# Patient Record
Sex: FEMALE | Race: ASIAN
[De-identification: names, ages, dates, MRNs, and addresses within clinical notes are randomized per-mention and may not be internally consistent; named-entity substitution may affect disease eponyms.]

---

## 2018-06-26 ENCOUNTER — HOSPITAL ENCOUNTER (EMERGENCY)
Dept: HOSPITAL 76 - ED | Age: 38
Discharge: HOME | End: 2018-06-26
Payer: COMMERCIAL

## 2018-06-26 VITALS — SYSTOLIC BLOOD PRESSURE: 157 MMHG | DIASTOLIC BLOOD PRESSURE: 92 MMHG

## 2018-06-26 DIAGNOSIS — B02.9: Primary | ICD-10-CM

## 2018-06-26 PROCEDURE — 99283 EMERGENCY DEPT VISIT LOW MDM: CPT

## 2018-06-26 NOTE — ED PHYSICIAN DOCUMENTATION
PD HPI SKIN





- Stated complaint


Stated Complaint: RASH RT KNEE





- Chief complaint


Chief Complaint: Wound





- History obtained from


History obtained from: Patient





- History of Present Illness


Timing - onset: Enter  time (22:30), Today


Timing - details: Abrupt onset


Pain level now: 1


Location: RLE


Quality / character: Burning, Discolored, Raised


Recently seen: Not recently seen





- Additional information


Additional information: 





diagnosed with shingles RLE approximately 5 months ago, was not prescribed 

medication because it was several days after rash developed. The rash 

eventually resolved, but tonight she noticed recurrence of lesions in the same 

site (but clearly new lesions, as the previous had resolved). 





Review of Systems


Constitutional: denies: Fever


Skin: reports: Rash





PD PAST MEDICAL HISTORY





- Past Medical History


Past Medical History: No


Derm: Herpes zoster





- Past Surgical History


Past Surgical History: No





- Present Medications


Home Medications: 


 Ambulatory Orders











 Medication  Instructions  Recorded  Confirmed


 


Valacyclovir HCl [Valacyclovir] 1,000 mg PO TID #20 tablet 06/26/18 














- Allergies


Allergies/Adverse Reactions: 


 Allergies











Allergy/AdvReac Type Severity Reaction Status Date / Time


 


No Known Drug Allergies Allergy   Verified 06/26/18 00:28














- Social History


Does the pt smoke?: No


Smoking Status: Never smoker


Does the pt drink ETOH?: No


Does the pt have substance abuse?: No





- Immunizations


Immunizations are current?: Yes





- POLST


Patient has POLST: No





PD ED PE NORMAL





- Vitals


Vital signs reviewed: Yes





- General


General: Alert and oriented X 3, No acute distress, Well developed/nourished





- Extremities


Extremities: No edema





PD ED PE EXPANDED





- Extremities


Extremities: Other (exanthem is on posterior surface of right knee; there are 

two clusters of erythematous vesicles)





Results





- Vitals


Vitals: 


 Vital Signs - 24 hr











  06/26/18





  00:23


 


Temperature 36.4 C L


 


Heart Rate 75


 


Respiratory 18





Rate 


 


Blood Pressure 157/92 H


 


O2 Saturation 97








 Oxygen











O2 Source                      Room air

















PD MEDICAL DECISION MAKING





- ED course


Complexity details: considered differential, d/w patient


ED course: 





rash is very s/o shingles (zoster), just started tonight and thus will rx 

valacyclovir





- Sepsis Event


Vital Signs: 


 Vital Signs - 24 hr











  06/26/18





  00:23


 


Temperature 36.4 C L


 


Heart Rate 75


 


Respiratory 18





Rate 


 


Blood Pressure 157/92 H


 


O2 Saturation 97








 Oxygen











O2 Source                      Room air

















Departure





- Departure


Disposition: 01 Home, Self Care


Clinical Impression: 


 Shingles





Condition: Good


Instructions:  ED Shingles


Prescriptions: 


Valacyclovir HCl [Valacyclovir] 1,000 mg PO TID #20 tablet


Discharge Date/Time: 06/26/18 01:20

## 2021-06-09 ENCOUNTER — HOSPITAL ENCOUNTER (OUTPATIENT)
Dept: HOSPITAL 76 - DI | Age: 41
Discharge: HOME | End: 2021-06-09
Attending: GENERAL ACUTE CARE HOSPITAL
Payer: COMMERCIAL

## 2021-06-09 DIAGNOSIS — Z12.31: Primary | ICD-10-CM

## 2021-06-09 DIAGNOSIS — R92.8: ICD-10-CM

## 2021-06-10 NOTE — MAMMOGRAPHY REPORT
BILATERAL DIGITAL SCREENING MAMMOGRAM 3D/2D: 6/9/2021

CLINICAL: Baseline exam. Routine screening.  

 

No prior exams were available for comparison.  The tissue of both breasts is heterogeneously dense. T
his may lower the sensitivity of mammography.  

 

 

There is an oval low density mass with an indistinct and circumscribed margin in the right breast at 
11 o'clock anterior depth. 

 

There also is an oval low density focal asymmetry with an indistinct and circumscribed margin in the 
right breast at 10 o'clock middle depth.  

 

Additionally, there is a cluster of oval low density asymmetries with an indistinct and circumscribed
 margin in the right breast at 11 o'clock posterior depth.  

 

In addition, there is an oval equal density mass with an indistinct and circumscribed margin in the r
ight breast at 2 o'clock anterior depth.  

 

There is an oval low density focal asymmetry with an indistinct and circumscribed margin in the left 
breast at 5 o'clock posterior depth.  

 

No other significant masses or calcifications are seen in either breast.  

 

IMPRESSION: INCOMPLETE: NEEDS ADDITIONAL IMAGING EVALUATION

The oval low density mass in the right breast at 11 o'clock anterior depth resembles a cyst and is in
determinate.  An ultrasound is recommended.  

 

The oval low density focal asymmetry in the right breast at 10 o'clock middle depth resembles a cyst 
and is indeterminate.  An ultrasound is recommended.  

 

The cluster of oval low density asymmetries in the right breast at 11 o'clock posterior depth resembl
es clustered cysts and is indeterminate.  An ultrasound is recommended.  

 

The oval equal density mass in the right breast at 2 o'clock anterior depth is indeterminate.  An ult
rasound is recommended.

  

The oval low density focal asymmetry in the left breast at 5 o'clock posterior depth is indeterminate
.  An ultrasound is recommended.  

 

 

This exam was interpreted at Station ID: [id number].  

 

NOTE: For mammograms, a report in lay terms will be sent to the patient. Approximately 15% of breast 
malignancies will not be visualized mammographically. In the management of a palpable breast mass, a 
negative mammogram must not discourage biopsy of a clinically suspicious lesion.

 

Electronically Signed By: Hector Mckeon M.D.          

ddp/:6/9/2021 16:34:08  

 

 

 

ACR BI-RADS Category 0: Incomplete 3340F

PARENCHYMAL PATTERN: (D) - The breast(s) demonstrate(s) heterogeneously dense fibroglandular anel huntley.

BI-RADS CATEGORY: (0) - 0

Ultrasound

[id number]

Immediate follow-up

LATERALITY: (B)

## 2021-06-23 ENCOUNTER — HOSPITAL ENCOUNTER (OUTPATIENT)
Dept: HOSPITAL 76 - DI | Age: 41
Discharge: HOME | End: 2021-06-23
Attending: STUDENT IN AN ORGANIZED HEALTH CARE EDUCATION/TRAINING PROGRAM
Payer: COMMERCIAL

## 2021-06-23 DIAGNOSIS — D24.1: ICD-10-CM

## 2021-06-23 DIAGNOSIS — N63.11: ICD-10-CM

## 2021-06-23 DIAGNOSIS — R92.2: Primary | ICD-10-CM

## 2021-06-24 NOTE — ULTRASOUND REPORT
LIMITED ULTRASOUND OF LEFT BREAST: 6/23/2021

CLINICAL: Patient returns for additional imaging over suspected masses in both breasts.  

 

Comparison is made to exam dated:  6/9/2021 mammogram - [facility].  

 Real-time ultrasound of the left breast 5 o'clock region was performed.  Gray scale images of the re
al-time examination were reviewed.  

 

No significant abnormalities were seen sonographically in the left breast.  

IMPRESSION: PROBABLY BENIGN 

There is no abnormality seen in the left breast to correspond with the mammography finding (focal asy
mmetry) in the lower outer quadrant which likely represents asymmetric distribution of fibroglandular
 tissue.  

A follow-up left mammogram with possible ultrasound in 6 months is recommended to demonstrate stabili
ty.  

 

Findings and recommendations were conveyed to the patient during today's evaluation.

 

 

This exam was interpreted at Station ID: [id number].  

Electronically Signed By: Farshad London M.D.  

aty/:6/23/2021 18:07:00  

 

 

 

Ultrasound BI-RADS: 3 Probably benign

BI-RADS CATEGORY: (3) - 3

Mammo and US

[id number]

6 month follow-up

LATERALITY: (L)

## 2021-06-24 NOTE — ULTRASOUND REPORT
LIMITED ULTRASOUND OF RIGHT BREAST AND AXILLA: 6/23/2021

CLINICAL: Patient returns for additional imaging over suspected masses in both breasts.  

 

Comparison is made to exam dated:  6/9/2021 mammogram - Merged with [facility].  

Color flow and real-time ultrasound of the right breast 2 o'clock, 10-11 o'clock, and axilla regions 
were performed.  Gray scale images of the real-time examination were reviewed.  

 

There is a 0.9 cm x 0.3 cm x 0.9 cm irregular mass in the right breast at 11 o'clock posterior depth 
8 cm from the nipple.  This irregular mass is hypoechoic with no posterior acoustic shadowing or enha
ncement.  This correlates with mammography findings.  Color flow imaging demonstrates that there is n
o vascularity present.  

 

There also is a 0.8 cm x 0.3 cm x 0.5 cm wider than tall oval mass with a circumscribed margin in the
 right breast at 11 o'clock anterior depth 4 cm from the nipple.  This oval mass is hypoechoic with a
 well-defined boundary and no posterior acoustic shadowing or enhancement.  This correlates with mamm
ography findings.  Color flow imaging demonstrates that there is no vascularity present.  

 

Additionally, there is a 1 cm x 0.3 cm x 0.7 cm wider than tall oval mass with a circumscribed margin
 in the right breast at 10 o'clock middle depth 5 cm from the nipple.  This oval mass is hypoechoic w
ith a well-defined boundary and no posterior acoustic shadowing or enhancement.  This correlates with
 mammography findings.  Color flow imaging demonstrates that there is no vascularity present.  

 

In addition, there is a 0.7 cm x 0.3 cm x 0.6 cm wider than tall oval mass with a circumscribed reny
n in the right breast at 2 o'clock anterior depth 2 cm from the nipple.  This oval mass is hypoechoic
 with a well-defined boundary and no posterior acoustic shadowing or enhancement.  This correlates wi
th mammography findings.  Color flow imaging demonstrates that there is no vascularity present.  

 

No significant abnormalities were seen sonographically in the right axilla.  

 

IMPRESSION: SUSPICIOUS OF MALIGNANCY 

The 0.9 cm x 0.3 cm x 0.9 cm irregular mass in the right breast at 11 o'clock posterior depth is at a
 low suspicion for malignancy.  An ultrasound guided biopsy is recommended.  

 

The 0.8 cm x 0.3 cm x 0.5 cm wider than tall oval mass in the right breast at 11 o'clock anterior dep
th resembles a fibroadenoma and is probably benign.  Recommend follow up right breast ultrasound in 6
 months to document stability.

 

The 1 cm x 0.3 cm x 0.7 cm wider than tall oval mass in the right breast at 10 o'clock middle depth r
esembles a fibroadenoma and is probably benign.  Recommend follow up right breast ultrasound in 6 mon
ths to document stability. 

 

The 0.7 cm x 0.3 cm x 0.6 cm wider than tall oval mass in the right breast at 2 o'clock anterior dept
h resembles a fibroadenoma and is probably benign.  Recommend follow up right breast ultrasound in 6 
months to document stability.

 

Findings and recommendations were discussed with the patient by Dr. Otoole during today's examinati
on.

 

 

This exam was interpreted at Station ID: [id number].  

Electronically Signed By: Farshad London M.D.  

aty/:6/23/2021 18:15:54  

 

 

 

Ultrasound BI-RADS: 4a Low suspicion for malignancy

BI-RADS CATEGORY: (4a) - Low Susp

None

[id number]

Immediate follow-up

LATERALITY: ()

## 2021-07-19 ENCOUNTER — HOSPITAL ENCOUNTER (OUTPATIENT)
Dept: HOSPITAL 76 - DI | Age: 41
Discharge: HOME | End: 2021-07-19
Attending: STUDENT IN AN ORGANIZED HEALTH CARE EDUCATION/TRAINING PROGRAM
Payer: COMMERCIAL

## 2021-07-19 DIAGNOSIS — N62: ICD-10-CM

## 2021-07-19 DIAGNOSIS — N60.11: Primary | ICD-10-CM

## 2021-07-19 PROCEDURE — 19083 BX BREAST 1ST LESION US IMAG: CPT

## 2021-07-20 NOTE — ULTRASOUND REPORT
ULTRASOUND GUIDED BIOPSY RIGHT BREAST USING VACUUM DEVICE: 7/19/2021

CLINICAL: Right breast mass.  

 

PATIENT CONSENT: Risks (minor bleeding, infection, vasovagal reaction and repeat procedure), benefits
 and alternatives were explained to the patient and written informed consent was obtained.  

 

Correlation is made to exams dated:  6/23/2021 ultrasound, 6/23/2021 ultrasound, and 6/9/2021 mammogr
[facility].  

An ultrasound guided biopsy using real-time ultrasound was performed for the circumscribed irregular 
shaped cystic mass located in the right breast at 11 o'clock posterior depth.  This was described on 
the previous mammography and ultrasound reports.  The skin was prepped in the usual manner.  Topical 
and local anesthetic was administered to the access site.  A skin nick was made in the breast.  The a
bnormality was approached from the lateral aspect.  A biopsy needle was placed adjacent to the abnorm
ality under ultrasound guidance.  Once the needle was documented to be in the correct location, a spe
cimen was obtained using the Mammotome biopsy system.  A sterile dressing was applied to the access s
ite.  The specimen was sent to the laboratory for pathological analysis.  

 

 

IMPRESSION: ULTRASOUND GUIDED BIOPSY BENIGN 

Ultrasound guided biopsy of the cystic mass in the right breast posterior depth was successful.  Path
ology indicates benign mild ductal epithelial hyperplasia without atypia, and fibrocystic changes (FC
).  Pathology results are concordant with imaging findings.  

A follow-up bilateral mammograms and ultrasound in 6 months is recommended to demonstrate stability o
f bilateral findings.  

 

 

This exam was interpreted at Station ID: [id number].  

 

Airam Lassiter M.D.

[facility],krg/:7/20/2021 11:48:44

BI-RADS CATEGORY: () - 

Mammo and US

[id number]

6 month follow-up

LATERALITY: (B)

## 2022-01-19 ENCOUNTER — HOSPITAL ENCOUNTER (OUTPATIENT)
Dept: HOSPITAL 76 - DI | Age: 42
Discharge: HOME | End: 2022-01-19
Attending: FAMILY MEDICINE
Payer: COMMERCIAL

## 2022-01-19 DIAGNOSIS — R92.8: Primary | ICD-10-CM

## 2022-01-24 NOTE — ULTRASOUND REPORT
LIMITED ULTRASOUND OF LEFT BREAST: 1/19/2022

CLINICAL: Short term follow up for bilateral breasts.  

 

Comparison is made to exams dated:  1/19/2022 mammogram, 7/19/2021 ultrasound biopsy, 6/23/2021 ultra
sound, 6/23/2021 ultrasound, and 6/9/2021 mammogram - [facility].  

 Ultrasound of the left breast 5 o'clock region was performed.  Gray scale images of the real-time ex
amination were reviewed.  

 

There is no visible abnormality in the left breast at 5 o'clock posterior depth by ultrasound.

 

 

IMPRESSION: NEGATIVE 

There is no abnormality seen in the left breast to correspond with the mammography finding in the low
er outer quadrant which likely represents normal fibroglandular tissue.  

 

 

 

This exam was interpreted at Station ID: [id number].  

Electronically Signed By: Eyal Lewis M.D.  

jr/:1/19/2022 11:12:52  

 

 

 

Ultrasound BI-RADS: 1 Negative

BI-RADS CATEGORY: (1) - 1

Unspecified - other

 

recall n/a

LATERALITY: (B)

## 2022-01-24 NOTE — MAMMOGRAPHY REPORT
BILATERAL DIGITAL DIAGNOSTIC MAMMOGRAM 3D/2D: 1/19/2022

CLINICAL: Patient returns for a 6 month follow up of bilateral breasts.  

 

Comparison is made to exams dated:  7/19/2021 ultrasound biopsy, 6/23/2021 ultrasound, 6/23/2021 ultr
asound, and 6/9/2021 mammogram - [facility].  The tissue of both breasts is heterog
eneously dense. This may lower the sensitivity of mammography.  

 

No significant masses, calcifications, or other findings are seen in either breast.  

 

IMPRESSION: INCOMPLETE: NEEDS ADDITIONAL IMAGING EVALUATION

No suspicious mammographic abnormality. An ultrasound will be performed to follow-up the previous nelly
cribed cystic masses. 

 

This exam was interpreted at Station ID: [id number].  

 

NOTE: For mammograms, a report in lay terms will be sent to the patient. Approximately 15% of breast 
malignancies will not be visualized mammographically. In the management of a palpable breast mass, a 
negative mammogram must not discourage biopsy of a clinically suspicious lesion.

 

Electronically Signed By: Eyal Lewis M.D.          

jr/:1/21/2022 08:48:25  

 

 

 

ACR BI-RADS Category 0: Incomplete 3340F

PARENCHYMAL PATTERN: (D) - The breast(s) demonstrate(s) heterogeneously dense fibroglandular parenchy
ma.

BI-RADS CATEGORY: (0) - 0

Unspecified - other

 

recall n/a

LATERALITY: (B)

## 2022-01-24 NOTE — ULTRASOUND REPORT
LIMITED ULTRASOUND OF RIGHT BREAST: 1/19/2022

CLINICAL: Short term follow up for bilateral breasts.  

 

Comparison is made to exams dated:  1/19/2022 mammogram, 7/19/2021 ultrasound biopsy, 6/23/2021 ultra
sound, 6/23/2021 ultrasound, and 6/9/2021 mammogram - [facility].  

Color flow and real-time ultrasound of the right breast 2 o'clock and 10-11 o'clock regions were perf
ormed.  Gray scale images of the real-time examination were reviewed.  

 

 

The previously described masses are all unchanged in size and remain consistent in appearance with fi
broadenomas. No supicious morphology or features. 

 

1. There is a 0.9 cm x 0.3 cm x 0.9 cm irregular mass in the right breast at 11 o'clock posterior dep
th 8 cm from the nipple.  

This irregular mass is hypoechoic with no posterior acoustic shadowing or enhancement.  This correlat
es with mammography findings.  Color flow imaging demonstrates that there is no vascularity present. 
 

 

2. There also is a 0.8 cm x 0.3 cm x 0.5 cm wider than tall oval mass with a circumscribed margin in 
the right breast at 11 o'clock anterior depth 4 cm from the nipple.  This oval mass is hypoechoic wit
h a well-defined boundary and no posterior acoustic shadowing or enhancement.  This correlates with m
ammography findings.  Color flow imaging demonstrates that there is no vascularity present.  

 

3. There is a 1 cm x 0.3 cm x 0.7 cm wider than tall oval mass with a circumscribed margin in the rig
ht breast at 10 o'clock middle depth 5 cm from the nipple.  This oval mass is hypoechoic with a well-
defined boundary and no posterior acoustic shadowing or enhancement.  This correlates with mammograph
y findings.  Color flow imaging demonstrates that there is no vascularity present.  

 

4. There is a 0.7 cm x 0.3 cm x 0.6 cm wider than tall oval mass with a circumscribed margin in the r
ight breast at 2 o'clock anterior depth 2 cm from the nipple.  This oval mass is hypoechoic with a we
ll-defined boundary and no posterior acoustic shadowing or enhancement.  This correlates with mammogr
aphy findings.  Color flow imaging demonstrates that there is no vascularity present.  

IMPRESSION: BENIGN 

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 No additional de
dicated imaging follow-up is recommended. Clinical observation and management is recommended. A 1 yea
r screening mammogram is recommended.   

 

This exam was interpreted at Station ID: [id number].  

Electronically Signed By: Eyal Lewis M.D.  

jr/:1/19/2022 11:09:19  

 

 

 

Ultrasound BI-RADS: 2 Benign

BI-RADS CATEGORY: (2) - 2

RECOMMENDATION: (ANNUAL)  - Recommend routine annual screening mammography.

[id number]

1 year screening

LATERALITY: (B)

## 2022-10-19 ENCOUNTER — HOSPITAL ENCOUNTER (OUTPATIENT)
Dept: HOSPITAL 76 - DI.N | Age: 42
Discharge: HOME | End: 2022-10-19
Attending: NURSE PRACTITIONER
Payer: COMMERCIAL

## 2022-10-19 DIAGNOSIS — M48.07: ICD-10-CM

## 2022-10-19 DIAGNOSIS — M47.817: ICD-10-CM

## 2022-10-19 DIAGNOSIS — M47.816: Primary | ICD-10-CM

## 2022-10-19 DIAGNOSIS — M47.812: ICD-10-CM
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
## 2023-03-29 ENCOUNTER — HOSPITAL ENCOUNTER (OUTPATIENT)
Dept: HOSPITAL 76 - DI | Age: 43
Discharge: HOME | End: 2023-03-29
Attending: STUDENT IN AN ORGANIZED HEALTH CARE EDUCATION/TRAINING PROGRAM
Payer: COMMERCIAL

## 2023-03-29 DIAGNOSIS — R59.0: Primary | ICD-10-CM

## 2023-03-29 NOTE — ULTRASOUND REPORT
PROCEDURE:  Head or Neck Soft Tissue

 

INDICATIONS:  ENLARGED LYMPH NODES

 

TECHNIQUE:  

Real-time scanning was performed of the neck, with image documentation.  

 

COMPARISON:  None

 

FINDINGS:  

Ultrasound of the region of concern demonstrates a normal-appearing cervical chain lymph with a renif
orm shape and distinct fatty hilum. This measures 1.2 x 0.5 x 1.0 cm.

 

IMPRESSION:  No sonographic abnormality.

 

Reviewed by: Kosta Villarreal on 3/29/2023 3:50 PM PDT

Approved by: Kosta Villarreal on 3/29/2023 3:50 PM PDT

 

 

Station ID:  [id number]

## 2023-04-28 ENCOUNTER — HOSPITAL ENCOUNTER (OUTPATIENT)
Dept: HOSPITAL 76 - DI | Age: 43
Discharge: HOME | End: 2023-04-28
Attending: PHYSICIAN ASSISTANT
Payer: COMMERCIAL

## 2023-04-28 DIAGNOSIS — R05.9: Primary | ICD-10-CM

## 2023-04-28 NOTE — XRAY REPORT
PROCEDURE:  Chest 2 View X-Ray

 

INDICATIONS:  COUGH

 

TECHNIQUE:  2 views of the chest were acquired.  

 

COMPARISON:  None.

 

FINDINGS:  

 

Surgical changes and devices:  None.  

 

Lungs and pleura:  No pleural effusions or pneumothorax.  Lungs are clear.  

 

Mediastinum:  Mediastinal contours appear normal.  Heart size is normal.  

 

Bones and chest wall:  No suspicious bony lesions.  Overlying soft tissues appear unremarkable.  

 

 

IMPRESSION:  

 

No acute cardiopulmonary process.

 

 

 

Reviewed by: Ekaterina Teixeira MD on 4/28/2023 12:15 PM PDT

Approved by: Ekaterina Teixeira MD on 4/28/2023 12:15 PM PDT

 

 

Station ID:  [id number]

## 2023-05-16 ENCOUNTER — HOSPITAL ENCOUNTER (EMERGENCY)
Dept: HOSPITAL 76 - ED | Age: 43
Discharge: HOME | End: 2023-05-16
Payer: COMMERCIAL

## 2023-05-16 VITALS — SYSTOLIC BLOOD PRESSURE: 145 MMHG | DIASTOLIC BLOOD PRESSURE: 97 MMHG

## 2023-05-16 DIAGNOSIS — R07.9: Primary | ICD-10-CM

## 2023-05-16 LAB
ALBUMIN DIAFP-MCNC: 4.3 G/DL (ref 3.2–5.5)
ALBUMIN/GLOB SERPL: 1 {RATIO} (ref 1–2.2)
ALP SERPL-CCNC: 63 IU/L (ref 42–121)
ALT SERPL W P-5'-P-CCNC: 27 IU/L (ref 10–60)
ANION GAP SERPL CALCULATED.4IONS-SCNC: 12 MMOL/L (ref 6–13)
AST SERPL W P-5'-P-CCNC: 33 IU/L (ref 10–42)
BASOPHILS NFR BLD AUTO: 0 10^3/UL (ref 0–0.1)
BASOPHILS NFR BLD AUTO: 0.2 %
BILIRUB BLD-MCNC: 0.9 MG/DL (ref 0.2–1)
BUN SERPL-MCNC: 10 MG/DL (ref 6–20)
CALCIUM UR-MCNC: 8.9 MG/DL (ref 8.5–10.3)
CHLORIDE SERPL-SCNC: 104 MMOL/L (ref 101–111)
CO2 SERPL-SCNC: 21 MMOL/L (ref 21–32)
CREAT SERPLBLD-SCNC: 0.5 MG/DL (ref 0.4–1)
EOSINOPHIL # BLD AUTO: 0.2 10^3/UL (ref 0–0.7)
EOSINOPHIL NFR BLD AUTO: 1.8 %
ERYTHROCYTE [DISTWIDTH] IN BLOOD BY AUTOMATED COUNT: 13.2 % (ref 12–15)
GFRSERPLBLD MDRD-ARVRAT: 135 ML/MIN/{1.73_M2} (ref 89–?)
GLOBULIN SER-MCNC: 4.3 G/DL (ref 2.1–4.2)
GLUCOSE SERPL-MCNC: 182 MG/DL (ref 70–100)
HCT VFR BLD AUTO: 39.5 % (ref 37–47)
HGB UR QL STRIP: 13.6 G/DL (ref 12–16)
LIPASE SERPL-CCNC: 34 U/L (ref 22–51)
LYMPHOCYTES # SPEC AUTO: 2 10^3/UL (ref 1.5–3.5)
LYMPHOCYTES NFR BLD AUTO: 23.4 %
MCH RBC QN AUTO: 28.9 PG (ref 27–31)
MCHC RBC AUTO-ENTMCNC: 34.4 G/DL (ref 32–36)
MCV RBC AUTO: 84 FL (ref 81–99)
MONOCYTES # BLD AUTO: 0.4 10^3/UL (ref 0–1)
MONOCYTES NFR BLD AUTO: 4.3 %
NEUTROPHILS # BLD AUTO: 5.8 10^3/UL (ref 1.5–6.6)
NEUTROPHILS # SNV AUTO: 8.3 X10^3/UL (ref 4.8–10.8)
NEUTROPHILS NFR BLD AUTO: 70.1 %
NRBC # BLD AUTO: 0 /100WBC
NRBC # BLD AUTO: 0 X10^3/UL
PDW BLD AUTO: 9.2 FL (ref 7.9–10.8)
PLATELET # BLD: 321 10^3/UL (ref 130–450)
POTASSIUM SERPL-SCNC: 3.4 MMOL/L (ref 3.5–5)
PROT SPEC-MCNC: 8.6 G/DL (ref 6.7–8.2)
RBC MAR: 4.7 10^6/UL (ref 4.2–5.4)
SODIUM SERPLBLD-SCNC: 137 MMOL/L (ref 135–145)

## 2023-05-16 PROCEDURE — 80053 COMPREHEN METABOLIC PANEL: CPT

## 2023-05-16 PROCEDURE — 83690 ASSAY OF LIPASE: CPT

## 2023-05-16 PROCEDURE — 36415 COLL VENOUS BLD VENIPUNCTURE: CPT

## 2023-05-16 PROCEDURE — 93005 ELECTROCARDIOGRAM TRACING: CPT

## 2023-05-16 PROCEDURE — 84484 ASSAY OF TROPONIN QUANT: CPT

## 2023-05-16 PROCEDURE — 99284 EMERGENCY DEPT VISIT MOD MDM: CPT

## 2023-05-16 PROCEDURE — 99283 EMERGENCY DEPT VISIT LOW MDM: CPT

## 2023-05-16 PROCEDURE — 85025 COMPLETE CBC W/AUTO DIFF WBC: CPT

## 2023-05-16 PROCEDURE — 71045 X-RAY EXAM CHEST 1 VIEW: CPT

## 2023-05-16 RX ADMIN — POTASSIUM CHLORIDE STA MEQ: 1500 TABLET, EXTENDED RELEASE ORAL at 10:43

## 2023-05-16 NOTE — XRAY REPORT
PROCEDURE:  Chest 1 View X-Ray

 

INDICATIONS:  Chest Pain

 

TECHNIQUE:  One view of the chest was acquired.  

 

COMPARISON:  4/28/2023

 

FINDINGS:  

 

Surgical changes and devices:  None.  

 

Lungs and pleura:  No pleural effusions or pneumothorax.  Lungs are clear.  

 

Mediastinum:  Mediastinal contours appear normal.  Heart size is normal.  

 

Bones and chest wall:  No suspicious bony lesions.  Overlying soft tissues appear unremarkable.  

 

 

IMPRESSION:  

 

No acute radiographic abnormality.

 

 

 

Reviewed by: Dallas Larose MD on 5/16/2023 9:06 AM PDT

Approved by: Dallas Larose MD on 5/16/2023 9:06 AM PDT

 

 

Station ID:  [id number]

## 2023-05-16 NOTE — ED PHYSICIAN DOCUMENTATION
PD HPI CHEST PAIN





- Stated complaint


Stated Complaint: POUNDING CHEST/SHOULDER PX





- Chief complaint


Chief Complaint: Cardiac





- History obtained from


History obtained from: Patient





- Additional information


Additional information: 


Patient is a 42-year-old with a history of diabetes presenting for evaluation of

feeling like her chest was pounding around midnight and is well as some mild 

left-sided chest discomfort.  She noted that her blood pressure was elevated at 

home.  She recently has had URI symptoms and was told she had walking pneumonia 

and completed a course of antibiotics.  She says that the cough has gotten 

better.  She denies nausea, vomiting, shortness of breath, fever.  She no longer

has any chest pain.  She denies a history of known coronary artery disease.  

Does not take medications for blood pressure or cholesterol.  Denies early 

family history of CAD.  No history of PE or DVT.  No recent travel or 

immobilization.She denies concerns for pregnancy.








Review of Systems


Constitutional: denies: Fever


Cardiac: reports: Chest pain / pressure


Respiratory: denies: Dyspnea


GI: denies: Abdominal Pain, Vomiting


Musculoskeletal: denies: Extremity pain, Extremity swelling





PD PAST MEDICAL HISTORY





- Past Medical History


Derm: Herpes zoster





- Past Surgical History


Past Surgical History: No





- Present Medications


Home Medications: 


                                Ambulatory Orders











 Medication  Instructions  Recorded  Confirmed


 


metFORMIN [Glucophage] 500 mg PO BIDWM 05/16/23 05/16/23














- Allergies


Allergies/Adverse Reactions: 


                                    Allergies











Allergy/AdvReac Type Severity Reaction Status Date / Time


 


codeine AdvReac  Itching Verified 05/16/23 08:33














- Social History


Does the pt smoke?: No


Smoking Status: Never smoker


Does the pt drink ETOH?: No


Does the pt have substance abuse?: No





- Immunizations


Immunizations are current?: Yes





- POLST


Patient has POLST: No





PD ED PE NORMAL





- General


General: Alert and oriented X 3, No acute distress, Well developed/nourished





- HEENT


HEENT: Atraumatic





- Neck


Neck: Supple, no meningeal sign





- Cardiac


Cardiac: RRR, No murmur, Strong equal pulses, Other (No chest wall tenderness)





- Respiratory


Respiratory: No respiratory distress, Clear bilaterally





- Abdomen


Abdomen: Soft, Non tender, Non distended





- Derm


Derm: Warm and dry





- Extremities


Extremities: No edema, No calf tenderness / cord





- Neuro


Neuro: Normal speech





Results





- Vitals


Vitals: 


                               Vital Signs - 24 hr











  05/16/23 05/16/23 05/16/23





  08:34 11:00 11:49


 


Temperature 37.3 C  


 


Heart Rate 99 83 91


 


Respiratory 20 16 16





Rate   


 


Blood Pressure 172/109 H 150/99 H 145/97 H


 


O2 Saturation 98 99 98








                                     Oxygen











O2 Source                      Room air

















- EKG (time done)


  ** 0840


EKG releavant findings:: 


EKG personally interpreted by author of this note. Relevant findings are: 


Rate 83, normal sinus rhythm, no STEMI, No ST depressions, no prior for 

comparison


Rate: Rate (enter#) (83)


Rhythm: NSR


Intervals: No: Prolonged QT


Ischemia: T wave inversion (Minimal in inferior and Anterior lateral leads).  

No: ST elevation c/w ischemia, ST depression


Compare to prior EKG: Old EKG unavailable





- Labs


Labs: 


                                Laboratory Tests











  05/16/23 05/16/23 05/16/23





  08:59 08:59 08:59


 


WBC  8.3  


 


RBC  4.70  


 


Hgb  13.6  


 


Hct  39.5  


 


MCV  84.0  


 


MCH  28.9  


 


MCHC  34.4  


 


RDW  13.2  


 


Plt Count  321  


 


MPV  9.2  


 


Neut # (Auto)  5.8  


 


Lymph # (Auto)  2.0  


 


Mono # (Auto)  0.4  


 


Eos # (Auto)  0.2  


 


Baso # (Auto)  0.0  


 


Absolute Nucleated RBC  0.00  


 


Nucleated RBC %  0.0  


 


Sodium   137 


 


Potassium   3.4 L 


 


Chloride   104 


 


Carbon Dioxide   21 


 


Anion Gap   12.0 


 


BUN   10 


 


Creatinine   0.5 


 


Estimated GFR (MDRD)   135 


 


Glucose   182 H 


 


Calcium   8.9 


 


Total Bilirubin   0.9 


 


AST   33 


 


ALT   27 


 


Alkaline Phosphatase   63 


 


Troponin I High Sens    2.9


 


Total Protein   8.6 H 


 


Albumin   4.3 


 


Globulin   4.3 H 


 


Albumin/Globulin Ratio   1.0 


 


Lipase   34 














  05/16/23





  10:52


 


WBC 


 


RBC 


 


Hgb 


 


Hct 


 


MCV 


 


MCH 


 


MCHC 


 


RDW 


 


Plt Count 


 


MPV 


 


Neut # (Auto) 


 


Lymph # (Auto) 


 


Mono # (Auto) 


 


Eos # (Auto) 


 


Baso # (Auto) 


 


Absolute Nucleated RBC 


 


Nucleated RBC % 


 


Sodium 


 


Potassium 


 


Chloride 


 


Carbon Dioxide 


 


Anion Gap 


 


BUN 


 


Creatinine 


 


Estimated GFR (MDRD) 


 


Glucose 


 


Calcium 


 


Total Bilirubin 


 


AST 


 


ALT 


 


Alkaline Phosphatase 


 


Troponin I High Sens  2.9


 


Total Protein 


 


Albumin 


 


Globulin 


 


Albumin/Globulin Ratio 


 


Lipase 














PD Medical Decision Making





- ED course


Complexity details: reviewed results, re-evaluated patient, d/w patient


ED course: 





Patient is a 42-year-old presenting for evaluation of an episode of chest pain 

this morning that has since resolved.  She has a history of diabetes.  Her EKG 

is reviewed With no ST depressions or elevation.She does have some nonspecific 

changes.  CBC, chemistry, troponin were reviewed without significant findings.  

Second troponin is also negative. Symptoms started greater than 8 hours ago.  

She is PERC negative.  As her symptoms have resolved I doubt a dissection.She is

 low risk for ACS per the heart score.  She is symptom-free and has 2 negative 

troponins and feel she is appropriate for close outpatient follow-up.  She is 

advised on concerning symptoms to return for.








Departure





- Departure


Disposition: 01 Home, Self Care


Clinical Impression: 


 Chest pain





Condition: Stable


Instructions:  ED Chest Pain Atypical Unkn Cause


Follow-Up: 


KIRSTEN LEDEZMA MD [Primary Care Provider] - 


Comments: 


The exact cause for your chest pain at this time remains unclear.  Your cardiac 

markers have been negative which is reassuring that at this time you are not 

having a heart attack but you may need further testing to check your heart such 

as a stress test or an ultrasound of your heart.  I would recommend close 

follow-up with your primary care provider.  If it anytime your symptoms recur or

worsen in any way please return to the emergency department.


Discharge Date/Time: 05/16/23 11:50